# Patient Record
Sex: MALE | Race: WHITE | Employment: OTHER | URBAN - METROPOLITAN AREA
[De-identification: names, ages, dates, MRNs, and addresses within clinical notes are randomized per-mention and may not be internally consistent; named-entity substitution may affect disease eponyms.]

---

## 2018-12-14 ENCOUNTER — HOSPITAL ENCOUNTER (EMERGENCY)
Age: 78
Discharge: HOME | End: 2018-12-14
Attending: EMERGENCY MEDICINE
Payer: MEDICARE

## 2018-12-14 VITALS
HEIGHT: 65 IN | RESPIRATION RATE: 16 BRPM | BODY MASS INDEX: 28.32 KG/M2 | SYSTOLIC BLOOD PRESSURE: 131 MMHG | TEMPERATURE: 98 F | DIASTOLIC BLOOD PRESSURE: 80 MMHG | OXYGEN SATURATION: 98 % | WEIGHT: 170 LBS | HEART RATE: 81 BPM

## 2018-12-14 DIAGNOSIS — T83.010A SUPRAPUBIC CATHETER DYSFUNCTION, INITIAL ENCOUNTER (HCC): Primary | ICD-10-CM

## 2018-12-14 PROCEDURE — 99283 EMERGENCY DEPT VISIT LOW MDM: CPT | Performed by: EMERGENCY MEDICINE

## 2018-12-14 RX ORDER — METOPROLOL TARTRATE 100 MG/1
TABLET ORAL 2 TIMES DAILY
COMMUNITY

## 2018-12-14 RX ORDER — PRAVASTATIN SODIUM 20 MG/1
20 TABLET ORAL
COMMUNITY

## 2018-12-14 RX ORDER — LISINOPRIL AND HYDROCHLOROTHIAZIDE 12.5; 2 MG/1; MG/1
TABLET ORAL DAILY
COMMUNITY

## 2018-12-14 RX ORDER — AMLODIPINE BESYLATE 5 MG/1
5 TABLET ORAL DAILY
COMMUNITY

## 2018-12-14 RX ORDER — METFORMIN HYDROCHLORIDE 500 MG/1
TABLET ORAL 2 TIMES DAILY WITH MEALS
COMMUNITY

## 2018-12-14 RX ORDER — TAMSULOSIN HYDROCHLORIDE 0.4 MG/1
0.4 CAPSULE ORAL DAILY
COMMUNITY

## 2018-12-14 RX ORDER — GUAIFENESIN 100 MG/5ML
81 LIQUID (ML) ORAL DAILY
COMMUNITY

## 2018-12-14 NOTE — ED NOTES
I have reviewed discharge instructions with the patient. The patient verbalized understanding. Patient left ED via Discharge Method: ambulatory to Home with (insert name of family/friend, self, transport wife). Opportunity for questions and clarification provided. Patient given 0 scripts. To continue your aftercare when you leave the hospital, you may receive an automated call from our care team to check in on how you are doing. This is a free service and part of our promise to provide the best care and service to meet your aftercare needs.  If you have questions, or wish to unsubscribe from this service please call [phone]. Thank you for Choosing our [facility] Department.

## 2018-12-14 NOTE — DISCHARGE INSTRUCTIONS
Please follow up with ER physicians in [place] when you are back there. Please return for any new or concerning issues.

## 2018-12-14 NOTE — ED NOTES
Patient reported that he urinated, but poured the urine down the sink. 250 mL of urine present in leg bag. Bladder scan showed 0 mL of urine.

## 2018-12-14 NOTE — ED NOTES
Patient's suprapubic catheter irrigated again with 70 mL sterile water. Water went catheter through with no complications. MD updated.

## 2018-12-14 NOTE — ED PROVIDER NOTES
Patient is a 78-year-old male presenting with suprapubic catheter problem. States he has a colostomy as well as suprapubic catheter in place. States he is able to urinate through his penis however catheter placed to drain off excess. Has a history of rectal cancer. Patient and wife are visiting from [place]      The history is provided by the patient. No  was used. Urinary Catheter Problem    Pertinent negatives include no abdominal pain and no back pain. His past medical history is significant for urinary catheter problem. Past Medical History:   Diagnosis Date    CAD (coronary artery disease)     Hypertension     Urinary complication, postoperative        Past Surgical History:   Procedure Laterality Date    CARDIAC SURG PROCEDURE UNLIST           History reviewed. No pertinent family history. Social History     Socioeconomic History    Marital status:      Spouse name: Not on file    Number of children: Not on file    Years of education: Not on file    Highest education level: Not on file   Social Needs    Financial resource strain: Not on file    Food insecurity - worry: Not on file    Food insecurity - inability: Not on file    Transportation needs - medical: Not on file   [employer] needs - non-medical: Not on file   Occupational History    Not on file   Tobacco Use    Smoking status: Never Smoker    Smokeless tobacco: Never Used   Substance and Sexual Activity    Alcohol use: No     Frequency: Never    Drug use: No    Sexual activity: Not on file   Other Topics Concern    Not on file   Social History Narrative    Not on file         ALLERGIES: Patient has no known allergies. Review of Systems   Constitutional: Negative for fatigue and fever. HENT: Negative for sore throat. Respiratory: Negative for cough, chest tightness and shortness of breath. Cardiovascular: Negative for leg swelling.    Gastrointestinal: Negative for abdominal pain. Genitourinary: Negative for dysuria. Musculoskeletal: Negative for back pain. Neurological: Negative for syncope and headaches. Psychiatric/Behavioral: Negative for confusion. Vitals:    12/14/18 1308   BP: 142/84   Pulse: 78   Resp: 18   Temp: 97.7 °F (36.5 °C)   SpO2: 96%   Weight: 77.1 kg (170 lb)   Height: 5' 5\" (1.651 m)            Physical Exam   Constitutional: He is oriented to person, place, and time. He appears well-developed and well-nourished. No distress. HENT:   Head: Normocephalic and atraumatic. Eyes: Conjunctivae and EOM are normal. Pupils are equal, round, and reactive to light. Neck: Normal range of motion. Neck supple. Cardiovascular: Normal rate, regular rhythm and normal heart sounds. Pulmonary/Chest: Effort normal and breath sounds normal. No respiratory distress. He has no wheezes. He has no rales. Abdominal: Soft. He exhibits no distension. There is no tenderness. There is no rebound. No abdominal or suprapubic tenderness. Patient has lost me back in place 12 [place] suprapubic pubic catheter in place. No urine in bag. Musculoskeletal: Normal range of motion. He exhibits no edema or tenderness. Neurological: He is alert and oriented to person, place, and time. Skin: Skin is warm and dry. No rash noted. He is not diaphoretic. Psychiatric: He has a normal mood and affect. His behavior is normal.   Nursing note and vitals reviewed. MDM  Number of Diagnoses or Management Options  Suprapubic catheter dysfunction, initial encounter [facility]): new and does not require workup  Diagnosis management comments: Suprapubic catheter flushed by nursing. It is working now without difficulty. It is draining. No signs of infection. Will send home with return precautions. Patient family express understanding. Carly Jeffery MD; 12/14/2018 @3:45 PM Voice dictation software was used during the making of this note.   This software is not perfect and grammatical and other typographical errors may be present.   This note has not been proofread for errors.  ===================================================================         Amount and/or Complexity of Data Reviewed  Review and summarize past medical records: yes    Risk of Complications, Morbidity, and/or Mortality  Presenting problems: low  Diagnostic procedures: low  Management options: low    Patient Progress  Patient progress: improved         Procedures

## 2018-12-14 NOTE — ED TRIAGE NOTES
Pt sts suprapubic catheter \"stopped working\" during the night. Pt sts no urine noted in output bag.

## 2018-12-14 NOTE — ED NOTES
This RN attempted to irrigate patient's suprapubic catheter. Unable to flush sterile water through catheter. MD notified.

## 2018-12-16 ENCOUNTER — HOSPITAL ENCOUNTER (EMERGENCY)
Age: 78
Discharge: HOME | End: 2018-12-16
Attending: EMERGENCY MEDICINE
Payer: MEDICARE

## 2018-12-16 VITALS
HEART RATE: 81 BPM | RESPIRATION RATE: 18 BRPM | TEMPERATURE: 97.6 F | BODY MASS INDEX: 28.32 KG/M2 | DIASTOLIC BLOOD PRESSURE: 88 MMHG | HEIGHT: 65 IN | WEIGHT: 170 LBS | OXYGEN SATURATION: 94 % | SYSTOLIC BLOOD PRESSURE: 170 MMHG

## 2018-12-16 DIAGNOSIS — T83.011A MALFUNCTION OF INDWELLING URINARY CATHETER, INITIAL ENCOUNTER (HCC): Primary | ICD-10-CM

## 2018-12-16 PROCEDURE — 99282 EMERGENCY DEPT VISIT SF MDM: CPT | Performed by: EMERGENCY MEDICINE

## 2018-12-16 PROCEDURE — 77030005529 HC CATH URETH FOL40 BARD -A

## 2018-12-16 PROCEDURE — 77030005518 HC CATH URETH FOL 2W BARD -B

## 2018-12-16 PROCEDURE — 51703 INSERT BLADDER CATH COMPLEX: CPT | Performed by: EMERGENCY MEDICINE

## 2018-12-16 NOTE — DISCHARGE INSTRUCTIONS
Recheck for worrisome symptoms. Suprapubic Catheter Care: Care Instructions  Your Care Instructions  A suprapubic catheter is a thin tube placed into your bladder just above the pubic bone. The tube allows urine to drain out of your bladder. The urine collects in a bag attached to the tube. The bag is usually attached to your leg. Sometimes the catheter tube has a valve that lets you drain the urine into the toilet or other container. You may need a suprapubic catheter if you have nerve damage, a problem with your urinary tract, or a disease that weakens your muscles. Having a catheter for a long time increases the risk of getting a urinary tract infection. So catheter care focuses on preventing infection. Follow-up care is a key part of your treatment and safety. Be sure to make and go to all appointments, and call your doctor if you are having problems. It's also a good idea to know your test results and keep a list of the medicines you take. How can you care for yourself at home? · Wash your hands before you handle the catheter. · Clean the area around the catheter with soap and water at least one time every day. Wash the area with soap and water after every bowel movement. · Keep the drainage bag lower than your bladder to keep urine from backing up. · Clean the bag every day after removing it from the catheter. Use another container while you clean the bag. To clean the bag, fill it with 2 parts vinegar to 3 parts water and let it stand for 20 minutes. Then empty it out, and let it air dry. · Empty the drainage bag when it is full or at least every 8 hours. When should you call for help? Call your doctor now or seek immediate medical care if:    · Your catheter becomes blocked and urine does not collect in the drainage bag.     · Your catheter leaks.     · You have blood or pus in your urine.     · You have pain in your back just below your rib cage.  This is called flank pain.     · You have a fever, chills, or body aches.     · You have groin or belly pain.     · Your urine is cloudy or smells bad.     · You have pain, increasing redness, or bleeding around the catheter.     · You have swelling around the catheter or in your belly.    Watch closely for changes in your health, and be sure to contact your doctor if you have any problems. Where can you learn more? Go to http://yamel-adeel.info/. Enter O796 in the search box to learn more about \"Suprapubic Catheter Care: Care Instructions. \"  Current as of: March 21, 2018  Content Version: 11.8  © 0376-5492 [employer]. Care instructions adapted under license by [employer] (which disclaims liability or warranty for this information). If you have questions about a medical condition or this instruction, always ask your healthcare professional. [address] any warranty or liability for your use of this information.

## 2018-12-16 NOTE — ED TRIAGE NOTES
Pt presents with catheter problem. Pt sts he had the catheter fall out. Pt sts the balloon was deflated when it came out.     Dr. Clary Mclean present during triage

## 2018-12-16 NOTE — ED PROVIDER NOTES
73-year-old gentleman has suprapubic catheter for the last year. States catheter fell out this evening. No fever or hematuria. The history is provided by the patient. Urinary Catheter Problem    This is a new problem. The current episode started 1 to 2 hours ago. The problem has not changed since onset. The patient is experiencing no pain. There has been no fever. Pertinent negatives include no chills, no sweats, no nausea, no vomiting, no frequency, no hematuria, no flank pain, no penile discharge, no abdominal pain and no back pain. His past medical history is significant for urinary catheter problem. Past Medical History:   Diagnosis Date    CAD (coronary artery disease)     Hypertension     Urinary complication, postoperative        Past Surgical History:   Procedure Laterality Date    CARDIAC SURG PROCEDURE UNLIST           History reviewed. No pertinent family history. Social History     Socioeconomic History    Marital status:      Spouse name: Not on file    Number of children: Not on file    Years of education: Not on file    Highest education level: Not on file   Social Needs    Financial resource strain: Not on file    Food insecurity - worry: Not on file    Food insecurity - inability: Not on file    Transportation needs - medical: Not on file   [employer] needs - non-medical: Not on file   Occupational History    Not on file   Tobacco Use    Smoking status: Never Smoker    Smokeless tobacco: Never Used   Substance and Sexual Activity    Alcohol use: No     Frequency: Never    Drug use: No    Sexual activity: Not on file   Other Topics Concern    Not on file   Social History Narrative    Not on file         ALLERGIES: Patient has no known allergies. Review of Systems   Constitutional: Negative for chills. Gastrointestinal: Negative for abdominal pain, nausea and vomiting.    Genitourinary: Negative for flank pain, frequency, hematuria and penile discharge. Musculoskeletal: Negative for back pain. Vitals:    12/16/18 0221   BP: 170/88   Pulse: 81   Resp: 18   Temp: 97.6 °F (36.4 °C)   SpO2: 94%   Weight: 77.1 kg (170 lb)   Height: 5' 5\" (1.651 m)            Physical Exam   Constitutional: He appears well-developed and well-nourished. No distress. Abdominal: Soft. There is no tenderness. Genitourinary: Penis normal.   Nursing note and vitals reviewed. MDM  Number of Diagnoses or Management Options  Diagnosis management comments: Replace catheter    Risk of Complications, Morbidity, and/or Mortality  Presenting problems: low  Diagnostic procedures: minimal  Management options: low    Patient Progress  Patient progress: stable         Procedures    Could not replace 16 [place] catheter. Was able to pass 15 [place] without difficulty. Good return of urine without leakage.

## 2018-12-16 NOTE — ED NOTES
I have reviewed discharge instructions with the patient. The patient verbalized understanding. Patient left ED via Discharge Method: ambulatory to Home with spouse. Opportunity for questions and clarification provided. Patient given 0 scripts. To continue your aftercare when you leave the hospital, you may receive an automated call from our care team to check in on how you are doing. This is a free service and part of our promise to provide the best care and service to meet your aftercare needs.  If you have questions, or wish to unsubscribe from this service please call [phone]. Thank you for Choosing our [facility] Emergency Department.